# Patient Record
Sex: FEMALE | Race: BLACK OR AFRICAN AMERICAN | Employment: FULL TIME | URBAN - METROPOLITAN AREA
[De-identification: names, ages, dates, MRNs, and addresses within clinical notes are randomized per-mention and may not be internally consistent; named-entity substitution may affect disease eponyms.]

---

## 2017-01-12 ENCOUNTER — HOSPITAL ENCOUNTER (OUTPATIENT)
Dept: MRI IMAGING | Age: 47
Discharge: HOME | End: 2017-01-12
Attending: NURSE PRACTITIONER
Payer: COMMERCIAL

## 2017-01-12 DIAGNOSIS — R29.898 WEAKNESS OF RIGHT FOOT: ICD-10-CM

## 2017-01-12 DIAGNOSIS — R20.2 PARESTHESIA OF RIGHT LEG: ICD-10-CM

## 2017-01-12 DIAGNOSIS — M54.9 SEVERE BACK PAIN: ICD-10-CM

## 2017-01-12 PROCEDURE — 72148 MRI LUMBAR SPINE W/O DYE: CPT

## 2019-02-13 ENCOUNTER — APPOINTMENT (OUTPATIENT)
Dept: MRI IMAGING | Age: 49
End: 2019-02-13
Attending: EMERGENCY MEDICINE
Payer: COMMERCIAL

## 2019-02-13 ENCOUNTER — HOSPITAL ENCOUNTER (EMERGENCY)
Age: 49
Discharge: OTHER INSTITUTION | End: 2019-02-13
Attending: EMERGENCY MEDICINE
Payer: COMMERCIAL

## 2019-02-13 ENCOUNTER — ANESTHESIA (OUTPATIENT)
Dept: SURGERY | Age: 49
End: 2019-02-13
Payer: COMMERCIAL

## 2019-02-13 ENCOUNTER — HOSPITAL ENCOUNTER (OUTPATIENT)
Age: 49
Setting detail: OBSERVATION
Discharge: HOME | End: 2019-02-15
Attending: EMERGENCY MEDICINE | Admitting: SPECIALIST
Payer: COMMERCIAL

## 2019-02-13 ENCOUNTER — ANESTHESIA EVENT (OUTPATIENT)
Dept: SURGERY | Age: 49
End: 2019-02-13
Payer: COMMERCIAL

## 2019-02-13 VITALS
OXYGEN SATURATION: 98 % | SYSTOLIC BLOOD PRESSURE: 159 MMHG | BODY MASS INDEX: 26.07 KG/M2 | DIASTOLIC BLOOD PRESSURE: 94 MMHG | HEART RATE: 48 BPM | WEIGHT: 176 LBS | HEIGHT: 69 IN | RESPIRATION RATE: 14 BRPM | TEMPERATURE: 99.5 F

## 2019-02-13 DIAGNOSIS — G83.4 CAUDA EQUINA SYNDROME (HCC): Primary | ICD-10-CM

## 2019-02-13 DIAGNOSIS — M54.41 ACUTE BILATERAL LOW BACK PAIN WITH RIGHT-SIDED SCIATICA: ICD-10-CM

## 2019-02-13 DIAGNOSIS — M51.27 HERNIATED NUCLEUS PULPOSUS, L5-S1: Primary | ICD-10-CM

## 2019-02-13 LAB
ANION GAP SERPL CALC-SCNC: 10 MMOL/L (ref 5–15)
APPEARANCE UR: CLEAR
BACTERIA URNS QL MICRO: NEGATIVE /HPF
BASOPHILS # BLD: 0 K/UL (ref 0–0.1)
BASOPHILS NFR BLD: 0 % (ref 0–1)
BILIRUB UR QL: NEGATIVE
BUN SERPL-MCNC: 22 MG/DL (ref 6–20)
BUN/CREAT SERPL: 21 (ref 12–20)
CALCIUM SERPL-MCNC: 9.2 MG/DL (ref 8.5–10.1)
CHLORIDE SERPL-SCNC: 102 MMOL/L (ref 97–108)
CO2 SERPL-SCNC: 28 MMOL/L (ref 21–32)
COLOR UR: NORMAL
CREAT SERPL-MCNC: 1.07 MG/DL (ref 0.55–1.02)
CRP SERPL-MCNC: <0.29 MG/DL
DIFFERENTIAL METHOD BLD: ABNORMAL
EOSINOPHIL # BLD: 0 K/UL (ref 0–0.4)
EOSINOPHIL NFR BLD: 0 % (ref 0–7)
EPITH CASTS URNS QL MICRO: NORMAL /LPF
ERYTHROCYTE [DISTWIDTH] IN BLOOD BY AUTOMATED COUNT: 13.2 % (ref 11.5–14.5)
ERYTHROCYTE [SEDIMENTATION RATE] IN BLOOD: 3 MM/HR (ref 0–20)
GLUCOSE SERPL-MCNC: 132 MG/DL (ref 65–100)
GLUCOSE UR STRIP.AUTO-MCNC: NEGATIVE MG/DL
HCT VFR BLD AUTO: 45 % (ref 35–47)
HGB BLD-MCNC: 14.7 G/DL (ref 11.5–16)
HGB UR QL STRIP: NEGATIVE
IMM GRANULOCYTES # BLD AUTO: 0.1 K/UL (ref 0–0.04)
IMM GRANULOCYTES NFR BLD AUTO: 1 % (ref 0–0.5)
KETONES UR QL STRIP.AUTO: NEGATIVE MG/DL
LEUKOCYTE ESTERASE UR QL STRIP.AUTO: NEGATIVE
LYMPHOCYTES # BLD: 0.8 K/UL (ref 0.8–3.5)
LYMPHOCYTES NFR BLD: 6 % (ref 12–49)
MCH RBC QN AUTO: 28.7 PG (ref 26–34)
MCHC RBC AUTO-ENTMCNC: 32.7 G/DL (ref 30–36.5)
MCV RBC AUTO: 87.7 FL (ref 80–99)
MONOCYTES # BLD: 0.7 K/UL (ref 0–1)
MONOCYTES NFR BLD: 5 % (ref 5–13)
NEUTS SEG # BLD: 12.7 K/UL (ref 1.8–8)
NEUTS SEG NFR BLD: 89 % (ref 32–75)
NITRITE UR QL STRIP.AUTO: NEGATIVE
NRBC # BLD: 0 K/UL (ref 0–0.01)
NRBC BLD-RTO: 0 PER 100 WBC
PH UR STRIP: 6 [PH] (ref 5–8)
PLATELET # BLD AUTO: 250 K/UL (ref 150–400)
PMV BLD AUTO: 10.2 FL (ref 8.9–12.9)
POTASSIUM SERPL-SCNC: 4.4 MMOL/L (ref 3.5–5.1)
PROT UR STRIP-MCNC: NEGATIVE MG/DL
RBC # BLD AUTO: 5.13 M/UL (ref 3.8–5.2)
RBC #/AREA URNS HPF: NORMAL /HPF (ref 0–5)
SODIUM SERPL-SCNC: 140 MMOL/L (ref 136–145)
SP GR UR REFRACTOMETRY: 1.03 (ref 1–1.03)
UA: UC IF INDICATED,UAUC: NORMAL
UROBILINOGEN UR QL STRIP.AUTO: 0.2 EU/DL (ref 0.2–1)
WBC # BLD AUTO: 14.3 K/UL (ref 3.6–11)
WBC URNS QL MICRO: NORMAL /HPF (ref 0–4)

## 2019-02-13 PROCEDURE — 99285 EMERGENCY DEPT VISIT HI MDM: CPT

## 2019-02-13 PROCEDURE — 77030034849: Performed by: SPECIALIST

## 2019-02-13 PROCEDURE — 77030018836 HC SOL IRR NACL ICUM -A: Performed by: SPECIALIST

## 2019-02-13 PROCEDURE — 81001 URINALYSIS AUTO W/SCOPE: CPT

## 2019-02-13 PROCEDURE — 72148 MRI LUMBAR SPINE W/O DYE: CPT

## 2019-02-13 PROCEDURE — 76060000035 HC ANESTHESIA 2 TO 2.5 HR: Performed by: SPECIALIST

## 2019-02-13 PROCEDURE — 77030032490 HC SLV COMPR SCD KNE COVD -B: Performed by: SPECIALIST

## 2019-02-13 PROCEDURE — 36415 COLL VENOUS BLD VENIPUNCTURE: CPT

## 2019-02-13 PROCEDURE — 72146 MRI CHEST SPINE W/O DYE: CPT

## 2019-02-13 PROCEDURE — 77030003666 HC NDL SPINAL BD -A: Performed by: SPECIALIST

## 2019-02-13 PROCEDURE — 76010000171 HC OR TIME 2 TO 2.5 HR INTENSV-TIER 1: Performed by: SPECIALIST

## 2019-02-13 PROCEDURE — 85652 RBC SED RATE AUTOMATED: CPT

## 2019-02-13 PROCEDURE — 99283 EMERGENCY DEPT VISIT LOW MDM: CPT

## 2019-02-13 PROCEDURE — 74011250636 HC RX REV CODE- 250/636: Performed by: EMERGENCY MEDICINE

## 2019-02-13 PROCEDURE — 76210000017 HC OR PH I REC 1.5 TO 2 HR: Performed by: SPECIALIST

## 2019-02-13 PROCEDURE — 96374 THER/PROPH/DIAG INJ IV PUSH: CPT

## 2019-02-13 PROCEDURE — 80048 BASIC METABOLIC PNL TOTAL CA: CPT

## 2019-02-13 PROCEDURE — 85025 COMPLETE CBC W/AUTO DIFF WBC: CPT

## 2019-02-13 PROCEDURE — 77030003029 HC SUT VCRL J&J -B: Performed by: SPECIALIST

## 2019-02-13 PROCEDURE — 77030029099 HC BN WAX SSPC -A: Performed by: SPECIALIST

## 2019-02-13 PROCEDURE — 86140 C-REACTIVE PROTEIN: CPT

## 2019-02-13 PROCEDURE — 74011250636 HC RX REV CODE- 250/636

## 2019-02-13 PROCEDURE — 77030002933 HC SUT MCRYL J&J -A: Performed by: SPECIALIST

## 2019-02-13 RX ORDER — DEXAMETHASONE SODIUM PHOSPHATE 10 MG/ML
10 INJECTION INTRAMUSCULAR; INTRAVENOUS
Status: COMPLETED | OUTPATIENT
Start: 2019-02-13 | End: 2019-02-13

## 2019-02-13 RX ORDER — OXYCODONE AND ACETAMINOPHEN 2.5; 325 MG/1; MG/1
1 TABLET ORAL
COMMUNITY
End: 2019-02-15

## 2019-02-13 RX ADMIN — DEXAMETHASONE SODIUM PHOSPHATE 10 MG: 10 INJECTION, SOLUTION INTRAMUSCULAR; INTRAVENOUS at 20:25

## 2019-02-13 NOTE — ED PROVIDER NOTES
51 y.o. female with no significant past medical history who presents from home with chief complaint of back pain. Patient endorses history of a herniated disc in her L5-S1 in 2016 which she received an epidural for by Dr. Marcello Sacks, MD. However, patient states she had onset again of lower back pain a month ago which worsened in nature a week ago. Patient was seen by her PCP two days ago and was sent home with Prednisone which she has been taking daily. Patient presents to [facility] ED today with persisting lower back pain which is constant and has been worsening in nature. Patient reports accompanying numbness in her groin and lower back, difficulties with urination and bowel movements, and episodes of urine incontinence all of which onset two nights ago. Patient reports taking Oxycodone for symptoms with temporary relief, last dose PTA. Patient notes having an appointment with Dr. Kimberly Fink next week. Pt denies fever, chills, cough, congestion, shortness of breath, chest pain, abdominal pain, nausea, vomiting, diarrhea, or dysuria. There are no other acute medical concerns at this time. PCP: Ovi Wooten NP Note written by Елена Shaffer, as dictated by Maria E Finley MD 2:10 PM 
 
 
 
 
  
 
No past medical history on file. No past surgical history on file. No family history on file. Social History Socioeconomic History  Marital status: UNKNOWN Spouse name: Not on file  Number of children: Not on file  Years of education: Not on file  Highest education level: Not on file Social Needs  Financial resource strain: Not on file  Food insecurity - worry: Not on file  Food insecurity - inability: Not on file  Transportation needs - medical: Not on file  Transportation needs - non-medical: Not on file Occupational History  Not on file Tobacco Use  Smoking status: Not on file Substance and Sexual Activity  Alcohol use: Not on file  Drug use: Not on file  Sexual activity: Not on file Other Topics Concern  Not on file Social History Narrative  Not on file ALLERGIES: Patient has no allergy information on record. Review of Systems Constitutional: Negative for chills and fever. HENT: Negative for congestion. Respiratory: Negative for cough and shortness of breath. Cardiovascular: Negative for chest pain. Gastrointestinal: Positive for constipation. Negative for abdominal pain, diarrhea, nausea and vomiting. Genitourinary: Positive for difficulty urinating. Negative for dysuria. Musculoskeletal: Positive for back pain. Neurological: Positive for numbness. Negative for dizziness and light-headedness. All other systems reviewed and are negative. There were no vitals filed for this visit. Physical Exam  
Constitutional: She is oriented to person, place, and time. She appears well-developed and well-nourished. HENT:  
Head: Normocephalic and atraumatic. Eyes: No scleral icterus. Neck: Normal range of motion. Cardiovascular: Normal rate. Pulmonary/Chest: Effort normal.  
Abdominal: She exhibits no distension. Genitourinary: Rectal exam shows anal tone abnormal.  
Musculoskeletal: No spinous process tenderness Neurological: She is alert and oriented to person, place, and time. Skin: No rash noted. No erythema. Nursing note and vitals reviewed. Note written by Елена Shabazz, as dictated by Sejal Win MD 2:21 PM 
  
 
MDM Number of Diagnoses or Management Options Amount and/or Complexity of Data Reviewed Clinical lab tests: ordered and reviewed Tests in the radiology section of CPT®: ordered and reviewed Decide to obtain previous medical records or to obtain history from someone other than the patient: yes Obtain history from someone other than the patient: yes (family) Review and summarize past medical records: yes Discuss the patient with other providers: yes (Ortho, neurosurgery) Independent visualization of images, tracings, or specimens: yes Critical Care Total time providing critical care: 30-74 minutes Patient Progress Patient progress: stable Procedures Rectal-no rectal tone. Post void bladder ultrasound with small amount of urine in bladder. 8:20 PM 
Discussed with Dr. Sergio Phillips ortho on call for Dr. Colleen Sepulveda. He does not have spine privileges at this hospital. Recommends transfer of pt. Discussed with pt, she would like to go to [facility]. Called [facility], neurosurgery is covering spine call tonight. Dr. Carmen Bonds paged. CONSULT NOTE: 
8:48 PM Fletcher Clay MD spoke with Dr. Carmen Bonds, Consult for Neurosurgery. Discussed available diagnostic tests and clinical findings. Dr. Carmen Bonds will accept pt for admission at [facility]. 
 
 
9:01 PM 
Fletcher Clay MD spoke with Dr. Osiris Mar at [facility] ED  Discussed available diagnostic tests and clinical findings. Accepts pt for transfer to ED at [facility].

## 2019-02-13 NOTE — ED TRIAGE NOTES
Pt arrives for lower back pain with difficulty urinating/constipation & incontinence. Hx of herniated disc in same area. Pain radiates down right leg. Reports midsection/groin feels \"numb\" starting Monday evening. Pt given diclofenac & prednisone by PCP with minimal relief. Pt took 2 oxycodone PTA

## 2019-02-14 ENCOUNTER — APPOINTMENT (OUTPATIENT)
Dept: GENERAL RADIOLOGY | Age: 49
End: 2019-02-14
Attending: SPECIALIST
Payer: COMMERCIAL

## 2019-02-14 PROBLEM — M51.26 LUMBAR HERNIATED DISC: Status: ACTIVE | Noted: 2019-02-14

## 2019-02-14 PROCEDURE — 97165 OT EVAL LOW COMPLEX 30 MIN: CPT

## 2019-02-14 PROCEDURE — 51798 US URINE CAPACITY MEASURE: CPT

## 2019-02-14 PROCEDURE — 74011250636 HC RX REV CODE- 250/636

## 2019-02-14 PROCEDURE — 77030008684 HC TU ET CUF COVD -B: Performed by: ANESTHESIOLOGY

## 2019-02-14 PROCEDURE — 76000 FLUOROSCOPY <1 HR PHYS/QHP: CPT

## 2019-02-14 PROCEDURE — 97116 GAIT TRAINING THERAPY: CPT

## 2019-02-14 PROCEDURE — 97161 PT EVAL LOW COMPLEX 20 MIN: CPT

## 2019-02-14 PROCEDURE — 74011000250 HC RX REV CODE- 250: Performed by: SPECIALIST

## 2019-02-14 PROCEDURE — 99218 HC RM OBSERVATION: CPT

## 2019-02-14 PROCEDURE — 74011000250 HC RX REV CODE- 250

## 2019-02-14 PROCEDURE — 74011250637 HC RX REV CODE- 250/637: Performed by: SPECIALIST

## 2019-02-14 PROCEDURE — 77030013079 HC BLNKT BAIR HGGR 3M -A: Performed by: ANESTHESIOLOGY

## 2019-02-14 PROCEDURE — 74011250636 HC RX REV CODE- 250/636: Performed by: ANESTHESIOLOGY

## 2019-02-14 PROCEDURE — 74011250636 HC RX REV CODE- 250/636: Performed by: SPECIALIST

## 2019-02-14 PROCEDURE — 77030026438 HC STYL ET INTUB CARD -A: Performed by: ANESTHESIOLOGY

## 2019-02-14 PROCEDURE — 97535 SELF CARE MNGMENT TRAINING: CPT

## 2019-02-14 RX ORDER — DIAZEPAM 5 MG/1
5 TABLET ORAL
Status: DISCONTINUED | OUTPATIENT
Start: 2019-02-14 | End: 2019-02-15 | Stop reason: HOSPADM

## 2019-02-14 RX ORDER — ROCURONIUM BROMIDE 10 MG/ML
INJECTION, SOLUTION INTRAVENOUS AS NEEDED
Status: DISCONTINUED | OUTPATIENT
Start: 2019-02-14 | End: 2019-02-14 | Stop reason: HOSPADM

## 2019-02-14 RX ORDER — SODIUM CHLORIDE 0.9 % (FLUSH) 0.9 %
5-40 SYRINGE (ML) INJECTION EVERY 8 HOURS
Status: DISCONTINUED | OUTPATIENT
Start: 2019-02-14 | End: 2019-02-14 | Stop reason: HOSPADM

## 2019-02-14 RX ORDER — HYDROMORPHONE HYDROCHLORIDE 1 MG/ML
INJECTION, SOLUTION INTRAMUSCULAR; INTRAVENOUS; SUBCUTANEOUS AS NEEDED
Status: DISCONTINUED | OUTPATIENT
Start: 2019-02-14 | End: 2019-02-14 | Stop reason: HOSPADM

## 2019-02-14 RX ORDER — MORPHINE SULFATE 10 MG/ML
2 INJECTION, SOLUTION INTRAMUSCULAR; INTRAVENOUS
Status: DISCONTINUED | OUTPATIENT
Start: 2019-02-14 | End: 2019-02-14 | Stop reason: HOSPADM

## 2019-02-14 RX ORDER — KETOROLAC TROMETHAMINE 30 MG/ML
INJECTION, SOLUTION INTRAMUSCULAR; INTRAVENOUS AS NEEDED
Status: DISCONTINUED | OUTPATIENT
Start: 2019-02-14 | End: 2019-02-14 | Stop reason: HOSPADM

## 2019-02-14 RX ORDER — ONDANSETRON 2 MG/ML
4 INJECTION INTRAMUSCULAR; INTRAVENOUS AS NEEDED
Status: DISCONTINUED | OUTPATIENT
Start: 2019-02-14 | End: 2019-02-14 | Stop reason: HOSPADM

## 2019-02-14 RX ORDER — SODIUM CHLORIDE 9 MG/ML
125 INJECTION, SOLUTION INTRAVENOUS CONTINUOUS
Status: DISCONTINUED | OUTPATIENT
Start: 2019-02-14 | End: 2019-02-14

## 2019-02-14 RX ORDER — FENTANYL CITRATE 50 UG/ML
INJECTION, SOLUTION INTRAMUSCULAR; INTRAVENOUS AS NEEDED
Status: DISCONTINUED | OUTPATIENT
Start: 2019-02-14 | End: 2019-02-14 | Stop reason: HOSPADM

## 2019-02-14 RX ORDER — FENTANYL CITRATE 50 UG/ML
50 INJECTION, SOLUTION INTRAMUSCULAR; INTRAVENOUS AS NEEDED
Status: DISCONTINUED | OUTPATIENT
Start: 2019-02-14 | End: 2019-02-14 | Stop reason: HOSPADM

## 2019-02-14 RX ORDER — SODIUM CHLORIDE 0.9 % (FLUSH) 0.9 %
5-40 SYRINGE (ML) INJECTION EVERY 8 HOURS
Status: DISCONTINUED | OUTPATIENT
Start: 2019-02-14 | End: 2019-02-15 | Stop reason: HOSPADM

## 2019-02-14 RX ORDER — SODIUM CHLORIDE, SODIUM LACTATE, POTASSIUM CHLORIDE, CALCIUM CHLORIDE 600; 310; 30; 20 MG/100ML; MG/100ML; MG/100ML; MG/100ML
125 INJECTION, SOLUTION INTRAVENOUS CONTINUOUS
Status: DISCONTINUED | OUTPATIENT
Start: 2019-02-14 | End: 2019-02-14 | Stop reason: HOSPADM

## 2019-02-14 RX ORDER — MIDAZOLAM HYDROCHLORIDE 1 MG/ML
0.5 INJECTION, SOLUTION INTRAMUSCULAR; INTRAVENOUS
Status: DISCONTINUED | OUTPATIENT
Start: 2019-02-14 | End: 2019-02-14 | Stop reason: HOSPADM

## 2019-02-14 RX ORDER — FACIAL-BODY WIPES
10 EACH TOPICAL DAILY PRN
Status: DISCONTINUED | OUTPATIENT
Start: 2019-02-14 | End: 2019-02-15 | Stop reason: HOSPADM

## 2019-02-14 RX ORDER — LIDOCAINE HYDROCHLORIDE 10 MG/ML
0.1 INJECTION, SOLUTION EPIDURAL; INFILTRATION; INTRACAUDAL; PERINEURAL AS NEEDED
Status: DISCONTINUED | OUTPATIENT
Start: 2019-02-14 | End: 2019-02-14 | Stop reason: HOSPADM

## 2019-02-14 RX ORDER — OXYCODONE AND ACETAMINOPHEN 5; 325 MG/1; MG/1
1 TABLET ORAL AS NEEDED
Status: DISCONTINUED | OUTPATIENT
Start: 2019-02-14 | End: 2019-02-14 | Stop reason: HOSPADM

## 2019-02-14 RX ORDER — PROPOFOL 10 MG/ML
INJECTION, EMULSION INTRAVENOUS AS NEEDED
Status: DISCONTINUED | OUTPATIENT
Start: 2019-02-14 | End: 2019-02-14 | Stop reason: HOSPADM

## 2019-02-14 RX ORDER — ONDANSETRON 2 MG/ML
INJECTION INTRAMUSCULAR; INTRAVENOUS AS NEEDED
Status: DISCONTINUED | OUTPATIENT
Start: 2019-02-14 | End: 2019-02-14 | Stop reason: HOSPADM

## 2019-02-14 RX ORDER — BUPIVACAINE HYDROCHLORIDE AND EPINEPHRINE 5; 5 MG/ML; UG/ML
INJECTION, SOLUTION EPIDURAL; INTRACAUDAL; PERINEURAL AS NEEDED
Status: DISCONTINUED | OUTPATIENT
Start: 2019-02-14 | End: 2019-02-14 | Stop reason: HOSPADM

## 2019-02-14 RX ORDER — NALOXONE HYDROCHLORIDE 0.4 MG/ML
0.4 INJECTION, SOLUTION INTRAMUSCULAR; INTRAVENOUS; SUBCUTANEOUS AS NEEDED
Status: DISCONTINUED | OUTPATIENT
Start: 2019-02-14 | End: 2019-02-15 | Stop reason: HOSPADM

## 2019-02-14 RX ORDER — CEFAZOLIN SODIUM/WATER 2 G/20 ML
2 SYRINGE (ML) INTRAVENOUS EVERY 8 HOURS
Status: COMPLETED | OUTPATIENT
Start: 2019-02-14 | End: 2019-02-14

## 2019-02-14 RX ORDER — MIDAZOLAM HYDROCHLORIDE 1 MG/ML
1 INJECTION, SOLUTION INTRAMUSCULAR; INTRAVENOUS AS NEEDED
Status: DISCONTINUED | OUTPATIENT
Start: 2019-02-14 | End: 2019-02-14 | Stop reason: HOSPADM

## 2019-02-14 RX ORDER — CEFAZOLIN SODIUM 1 G/3ML
INJECTION, POWDER, FOR SOLUTION INTRAMUSCULAR; INTRAVENOUS AS NEEDED
Status: DISCONTINUED | OUTPATIENT
Start: 2019-02-14 | End: 2019-02-14 | Stop reason: HOSPADM

## 2019-02-14 RX ORDER — SODIUM CHLORIDE 9 MG/ML
25 INJECTION, SOLUTION INTRAVENOUS CONTINUOUS
Status: DISCONTINUED | OUTPATIENT
Start: 2019-02-14 | End: 2019-02-14 | Stop reason: HOSPADM

## 2019-02-14 RX ORDER — ONDANSETRON 2 MG/ML
4 INJECTION INTRAMUSCULAR; INTRAVENOUS
Status: ACTIVE | OUTPATIENT
Start: 2019-02-14 | End: 2019-02-15

## 2019-02-14 RX ORDER — HYDROCODONE BITARTRATE AND ACETAMINOPHEN 5; 325 MG/1; MG/1
1 TABLET ORAL
Qty: 15 TAB | Refills: 0 | Status: SHIPPED | OUTPATIENT
Start: 2019-02-14

## 2019-02-14 RX ORDER — GLYCOPYRROLATE 0.2 MG/ML
INJECTION INTRAMUSCULAR; INTRAVENOUS AS NEEDED
Status: DISCONTINUED | OUTPATIENT
Start: 2019-02-14 | End: 2019-02-14 | Stop reason: HOSPADM

## 2019-02-14 RX ORDER — CYCLOBENZAPRINE HCL 10 MG
10 TABLET ORAL
Qty: 20 TAB | Refills: 0 | Status: SHIPPED | OUTPATIENT
Start: 2019-02-14

## 2019-02-14 RX ORDER — HYDROCODONE BITARTRATE AND ACETAMINOPHEN 5; 325 MG/1; MG/1
2 TABLET ORAL
Status: DISCONTINUED | OUTPATIENT
Start: 2019-02-14 | End: 2019-02-15

## 2019-02-14 RX ORDER — ACETAMINOPHEN 10 MG/ML
INJECTION, SOLUTION INTRAVENOUS AS NEEDED
Status: DISCONTINUED | OUTPATIENT
Start: 2019-02-14 | End: 2019-02-14 | Stop reason: HOSPADM

## 2019-02-14 RX ORDER — ADHESIVE BANDAGE
30 BANDAGE TOPICAL DAILY PRN
Status: DISCONTINUED | OUTPATIENT
Start: 2019-02-14 | End: 2019-02-15 | Stop reason: HOSPADM

## 2019-02-14 RX ORDER — MIDAZOLAM HYDROCHLORIDE 1 MG/ML
INJECTION, SOLUTION INTRAMUSCULAR; INTRAVENOUS AS NEEDED
Status: DISCONTINUED | OUTPATIENT
Start: 2019-02-14 | End: 2019-02-14 | Stop reason: HOSPADM

## 2019-02-14 RX ORDER — DIPHENHYDRAMINE HYDROCHLORIDE 50 MG/ML
12.5 INJECTION, SOLUTION INTRAMUSCULAR; INTRAVENOUS AS NEEDED
Status: DISCONTINUED | OUTPATIENT
Start: 2019-02-14 | End: 2019-02-14 | Stop reason: HOSPADM

## 2019-02-14 RX ORDER — NEOSTIGMINE METHYLSULFATE 1 MG/ML
INJECTION INTRAVENOUS AS NEEDED
Status: DISCONTINUED | OUTPATIENT
Start: 2019-02-14 | End: 2019-02-14 | Stop reason: HOSPADM

## 2019-02-14 RX ORDER — SODIUM CHLORIDE, SODIUM LACTATE, POTASSIUM CHLORIDE, CALCIUM CHLORIDE 600; 310; 30; 20 MG/100ML; MG/100ML; MG/100ML; MG/100ML
125 INJECTION, SOLUTION INTRAVENOUS CONTINUOUS
Status: DISCONTINUED | OUTPATIENT
Start: 2019-02-14 | End: 2019-02-14

## 2019-02-14 RX ORDER — SODIUM CHLORIDE 0.9 % (FLUSH) 0.9 %
5-40 SYRINGE (ML) INJECTION AS NEEDED
Status: DISCONTINUED | OUTPATIENT
Start: 2019-02-14 | End: 2019-02-14 | Stop reason: HOSPADM

## 2019-02-14 RX ORDER — FENTANYL CITRATE 50 UG/ML
25 INJECTION, SOLUTION INTRAMUSCULAR; INTRAVENOUS
Status: COMPLETED | OUTPATIENT
Start: 2019-02-14 | End: 2019-02-14

## 2019-02-14 RX ORDER — CYCLOBENZAPRINE HCL 10 MG
10 TABLET ORAL
Status: DISCONTINUED | OUTPATIENT
Start: 2019-02-14 | End: 2019-02-15 | Stop reason: HOSPADM

## 2019-02-14 RX ORDER — SODIUM CHLORIDE, SODIUM LACTATE, POTASSIUM CHLORIDE, CALCIUM CHLORIDE 600; 310; 30; 20 MG/100ML; MG/100ML; MG/100ML; MG/100ML
INJECTION, SOLUTION INTRAVENOUS
Status: DISCONTINUED | OUTPATIENT
Start: 2019-02-14 | End: 2019-02-14 | Stop reason: HOSPADM

## 2019-02-14 RX ORDER — SODIUM CHLORIDE 0.9 % (FLUSH) 0.9 %
5-40 SYRINGE (ML) INJECTION AS NEEDED
Status: DISCONTINUED | OUTPATIENT
Start: 2019-02-14 | End: 2019-02-15 | Stop reason: HOSPADM

## 2019-02-14 RX ORDER — LIDOCAINE HYDROCHLORIDE 20 MG/ML
INJECTION, SOLUTION EPIDURAL; INFILTRATION; INTRACAUDAL; PERINEURAL AS NEEDED
Status: DISCONTINUED | OUTPATIENT
Start: 2019-02-14 | End: 2019-02-14 | Stop reason: HOSPADM

## 2019-02-14 RX ORDER — HYDROCODONE BITARTRATE AND ACETAMINOPHEN 5; 325 MG/1; MG/1
1 TABLET ORAL
Status: DISCONTINUED | OUTPATIENT
Start: 2019-02-14 | End: 2019-02-15 | Stop reason: HOSPADM

## 2019-02-14 RX ORDER — ACETAMINOPHEN 325 MG/1
650 TABLET ORAL EVERY 6 HOURS
Status: DISCONTINUED | OUTPATIENT
Start: 2019-02-14 | End: 2019-02-15 | Stop reason: HOSPADM

## 2019-02-14 RX ORDER — DIPHENHYDRAMINE HCL 25 MG
25 CAPSULE ORAL
Status: DISCONTINUED | OUTPATIENT
Start: 2019-02-14 | End: 2019-02-15 | Stop reason: HOSPADM

## 2019-02-14 RX ADMIN — ROCURONIUM BROMIDE 20 MG: 10 INJECTION, SOLUTION INTRAVENOUS at 00:43

## 2019-02-14 RX ADMIN — HYDROCODONE BITARTRATE AND ACETAMINOPHEN 2 TABLET: 5; 325 TABLET ORAL at 23:47

## 2019-02-14 RX ADMIN — DIAZEPAM 5 MG: 5 TABLET ORAL at 07:38

## 2019-02-14 RX ADMIN — HYDROMORPHONE HYDROCHLORIDE 0.5 MG: 1 INJECTION, SOLUTION INTRAMUSCULAR; INTRAVENOUS; SUBCUTANEOUS at 02:30

## 2019-02-14 RX ADMIN — FENTANYL CITRATE 25 MCG: 50 INJECTION INTRAMUSCULAR; INTRAVENOUS at 03:05

## 2019-02-14 RX ADMIN — HYDROMORPHONE HYDROCHLORIDE 0.5 MG: 1 INJECTION, SOLUTION INTRAMUSCULAR; INTRAVENOUS; SUBCUTANEOUS at 01:06

## 2019-02-14 RX ADMIN — CEFAZOLIN SODIUM 2 G: 1 INJECTION, POWDER, FOR SOLUTION INTRAMUSCULAR; INTRAVENOUS at 00:16

## 2019-02-14 RX ADMIN — ACETAMINOPHEN 650 MG: 325 TABLET ORAL at 12:30

## 2019-02-14 RX ADMIN — GLYCOPYRROLATE 0.6 MG: 0.2 INJECTION INTRAMUSCULAR; INTRAVENOUS at 02:08

## 2019-02-14 RX ADMIN — Medication 2 G: at 17:58

## 2019-02-14 RX ADMIN — SODIUM CHLORIDE, SODIUM LACTATE, POTASSIUM CHLORIDE, CALCIUM CHLORIDE: 600; 310; 30; 20 INJECTION, SOLUTION INTRAVENOUS at 00:00

## 2019-02-14 RX ADMIN — PROPOFOL 110 MG: 10 INJECTION, EMULSION INTRAVENOUS at 00:07

## 2019-02-14 RX ADMIN — MIDAZOLAM HYDROCHLORIDE 2 MG: 1 INJECTION, SOLUTION INTRAMUSCULAR; INTRAVENOUS at 00:00

## 2019-02-14 RX ADMIN — SODIUM CHLORIDE 125 ML/HR: 900 INJECTION, SOLUTION INTRAVENOUS at 06:38

## 2019-02-14 RX ADMIN — NEOSTIGMINE METHYLSULFATE 3 MG: 1 INJECTION INTRAVENOUS at 02:08

## 2019-02-14 RX ADMIN — LIDOCAINE HYDROCHLORIDE 80 MG: 20 INJECTION, SOLUTION EPIDURAL; INFILTRATION; INTRACAUDAL; PERINEURAL at 00:07

## 2019-02-14 RX ADMIN — ONDANSETRON 4 MG: 2 INJECTION INTRAMUSCULAR; INTRAVENOUS at 01:41

## 2019-02-14 RX ADMIN — KETOROLAC TROMETHAMINE 30 MG: 30 INJECTION, SOLUTION INTRAMUSCULAR; INTRAVENOUS at 01:47

## 2019-02-14 RX ADMIN — FENTANYL CITRATE 25 MCG: 50 INJECTION INTRAMUSCULAR; INTRAVENOUS at 03:10

## 2019-02-14 RX ADMIN — Medication 10 ML: at 23:45

## 2019-02-14 RX ADMIN — ROCURONIUM BROMIDE 30 MG: 10 INJECTION, SOLUTION INTRAVENOUS at 00:07

## 2019-02-14 RX ADMIN — ACETAMINOPHEN 1000 MG: 10 INJECTION, SOLUTION INTRAVENOUS at 00:21

## 2019-02-14 RX ADMIN — ACETAMINOPHEN 650 MG: 325 TABLET ORAL at 06:38

## 2019-02-14 RX ADMIN — SODIUM CHLORIDE 125 ML/HR: 900 INJECTION, SOLUTION INTRAVENOUS at 11:51

## 2019-02-14 RX ADMIN — HYDROCODONE BITARTRATE AND ACETAMINOPHEN 1 TABLET: 5; 325 TABLET ORAL at 12:30

## 2019-02-14 RX ADMIN — FENTANYL CITRATE 25 MCG: 50 INJECTION INTRAMUSCULAR; INTRAVENOUS at 03:00

## 2019-02-14 RX ADMIN — ACETAMINOPHEN 650 MG: 325 TABLET ORAL at 17:58

## 2019-02-14 RX ADMIN — FENTANYL CITRATE 100 MCG: 50 INJECTION, SOLUTION INTRAMUSCULAR; INTRAVENOUS at 00:07

## 2019-02-14 RX ADMIN — FENTANYL CITRATE 25 MCG: 50 INJECTION INTRAMUSCULAR; INTRAVENOUS at 03:15

## 2019-02-14 RX ADMIN — Medication 2 G: at 07:41

## 2019-02-14 RX ADMIN — ROCURONIUM BROMIDE 10 MG: 10 INJECTION, SOLUTION INTRAVENOUS at 01:26

## 2019-02-14 RX ADMIN — MAGNESIUM HYDROXIDE 30 ML: 400 SUSPENSION ORAL at 17:58

## 2019-02-14 NOTE — ED TRIAGE NOTES
TRIAGE: Pt arrives from [facility] for dx of cauda equina syndrome. Pt arrives there with report of lower back pain, difficulty urinating or having a BM.  Pt also reported R sided numbness to leg that has since resolved, pt is ambulatory in ED

## 2019-02-14 NOTE — PERIOP NOTES
TRANSFER - OUT REPORT: 
 
Verbal report given to [facility] RN(name) on Kevin Delay  being transferred to 535(unit) for routine post - op Report consisted of patients Situation, Background, Assessment and  
Recommendations(SBAR). Time Pre op antibiotic [id number] Anesthesia Stop time: 0230 Bashir Present on Transfer to floor:yes Order for Bashir on Chart:yes Discharge Prescriptions with Chart:no Information from the following report(s) SBAR, OR Summary, Procedure Summary, Intake/Output, MAR, Recent Results, Med Rec Status and Cardiac Rhythm NSR was reviewed with the receiving nurse. Opportunity for questions and clarification was provided. Is the patient on 02? NO 
     L/Min ra Other Is the patient on a monitor? NO Is the nurse transporting with the patient? NO Surgical Waiting Area notified of patient's transfer from PACU? YES The following personal items collected during your admission accompanied patient upon transfer:  
Dental Appliance: Dental Appliances: None Vision: Visual Aid: Glasses Hearing Aid:   
Jewelry:   
Clothing:   
Other Valuables:   
Valuables sent to safe:

## 2019-02-14 NOTE — ED PROVIDER NOTES
51 y.o. female with past medical history significant for herniated lumbar disc and glaucoma who presents from EMS with chief complaint of back pain. Pt reports lower back pain for 1 month that worsened in severity 1 week ago. She notes accompanying right leg numbness for 3 days. Pt also c/o difficulty urinating, urine incontinence, and constipation. Pt was diagnosed with cauda equina at [facility] earlier today. Pt had an MRI at [facility], which revealed a L5-S1 herniated disk. Dr. Shima Maynard (Neurosurgery) recommended Pt to be transferred from [facility] ED to [facility] ED. Pt states she was seen by her PCP a few days ago. Pt states her PCP prescribed prednisone. Pt states she received a steroid injection at her PCP's office. Pt denies any other pain. There are no other acute medical concerns at this time. PCP: Haritha Lima NP Note written by Елена Sarah, as dictated by Lili Estrada MD 11:00 PM 
 
 
 
The history is provided by the patient. No  was used. No past medical history on file. No past surgical history on file. No family history on file. Social History Socioeconomic History  Marital status: SINGLE Spouse name: Not on file  Number of children: Not on file  Years of education: Not on file  Highest education level: Not on file Social Needs  Financial resource strain: Not on file  Food insecurity - worry: Not on file  Food insecurity - inability: Not on file  Transportation needs - medical: Not on file  Transportation needs - non-medical: Not on file Occupational History  Not on file Tobacco Use  Smoking status: Not on file Substance and Sexual Activity  Alcohol use: Not on file  Drug use: Not on file  Sexual activity: Not on file Other Topics Concern  Not on file Social History Narrative  Not on file ALLERGIES: Patient has no known allergies. Review of Systems Constitutional: Negative for fever. HENT: Negative for facial swelling and nosebleeds. Eyes: Negative for pain. Respiratory: Negative for cough, chest tightness and shortness of breath. Cardiovascular: Negative for chest pain and leg swelling. Gastrointestinal: Positive for constipation. Negative for abdominal pain, diarrhea and vomiting. Endocrine: Negative for polyuria. Genitourinary: Positive for decreased urine volume, difficulty urinating and urgency. Negative for flank pain. Musculoskeletal: Positive for back pain. Negative for arthralgias. Skin: Negative for color change. Allergic/Immunologic: Negative for immunocompromised state. Neurological: Positive for numbness (Right leg). Negative for dizziness and headaches. Hematological: Does not bruise/bleed easily. Psychiatric/Behavioral: Negative for agitation. All other systems reviewed and are negative. There were no vitals filed for this visit. Physical Exam  
Constitutional: She is oriented to person, place, and time. She appears well-developed and well-nourished. No distress. HENT:  
Head: Normocephalic and atraumatic. Right Ear: External ear normal.  
Left Ear: External ear normal.  
Eyes: Conjunctivae and EOM are normal.  
Neck: Normal range of motion. Neck supple. No tracheal deviation present. No thyromegaly present. Cardiovascular: Normal rate, regular rhythm, normal heart sounds and intact distal pulses. Exam reveals no gallop and no friction rub. No murmur heard. Pulmonary/Chest: Effort normal and breath sounds normal. No respiratory distress. She has no wheezes. She has no rales. She exhibits no tenderness. Abdominal: Soft. Bowel sounds are normal. She exhibits no distension. There is no tenderness. Musculoskeletal: Normal range of motion. She exhibits no edema, tenderness or deformity. Herniated L5-S1 disc. Neurological: She is alert and oriented to person, place, and time.  She has normal strength. She displays normal reflexes. No cranial nerve deficit or sensory deficit. She exhibits normal muscle tone. Coordination normal.  
Normal strength 5/5 of both legs. Normal sensation of legs. No saddle anesthesia. Skin: Skin is warm and dry. No rash noted. She is not diaphoretic. No erythema. Psychiatric: She has a normal mood and affect. Her behavior is normal. Judgment and thought content normal.  
Nursing note and vitals reviewed. Note written by Елена Nj, as dictated by Arabella Garcia MD 11:00 PM 
 
 
MDM Number of Diagnoses or Management Options Acute bilateral low back pain with right-sided sciatica:  
Herniated nucleus pulposus, L5-S1:  
Diagnosis management comments: 62-year-old [place] American female presents to the emergency department with concern for cauda equina syndrome. She has had difficulty with urinating and bowel movements. It sounds like she has had some back pain and numbness in her legs mostly in the right leg. MRI showed concern for disc protrusion and with patient's symptoms it was decided to transfer her here to see neurosurgery. We'll page him. Amount and/or Complexity of Data Reviewed Tests in the radiology section of CPT®: ordered and reviewed Decide to obtain previous medical records or to obtain history from someone other than the patient: yes Review and summarize past medical records: yes Independent visualization of images, tracings, or specimens: yes Procedures CONSULT NOTE: 
11:39 PM Arabella Garcia MD spoke with Dr. Magaly Boss, Consult for Neurosurgery. Discussed available diagnostic tests and clinical findings. Dr. Magaly Boss will admit Pt to OR for surgery.

## 2019-02-14 NOTE — PROGRESS NOTES
Problem: Self Care Deficits Care Plan (Adult) Goal: *Acute Goals and Plan of Care (Insert Text) Occupational Therapy Goals Initiated 2/14/2019 1. Patient will perform lower body dressing with modified independence using AE PRN within 4 days. 2.  Patient will perform toileting with modified independence using most appropriate DME within 4 days. 3.  Patient will grooming at modified independence within 4 days. 4.   Patient will verbalize/demonstrate 3/3 back precautions during ADL tasks without cues within 4 days. Occupational Therapy EVALUATION Patient: Sancho Clayton (19 y.o. female) Date: 2/14/2019 Primary Diagnosis: Lumbar herniated disc [M51.26] Procedure(s) (LRB): 
BILATERAL L5-S1 LUMBAR DISCECTOMY (Bilateral) 1 Day Post-Op Precautions:   Back ASSESSMENT : 
Based on the objective data described below, the patient presents with Setup upper body ADLs, Minimum assistance lower body ADLs, and Stand-by assistance to Contact guard assistance assist functional mobility without AD s/p B L5-S1 discectomy POD 0 due to emergent cauda equina symptoms. Pt with onset of R LE numbness, urinary incontinence and saddle area numbness, prompting visit to ER. She tolerated session well with all VSS. Likely one needs 1 more OT session to re-iterate techniques for dressing, toileting and bathing while adhering to back precautions. The following are barriers to independence with ADLs while in acute care:  
- Cognitive and/or behavioral: none - Medical condition: sensation to essie-area, new urinary incontinence   
- Other:    
 
Patient will benefit from skilled acute intervention to address the above impairments. Patients rehabilitation potential is considered to be Good Discharge recommendations: none at home, would benefit from OP women's health for pelvic strengthening Barriers to discharging home, in addition to above listed impairments: none, will discharge to her mother's house. Equipment recommendations for successful discharge (if) home: none PLAN : 
Recommendations and Planned Interventions: self care training, functional mobility training, patient education, home safety training and family training/education Frequency/Duration: Patient will be followed by occupational therapy 5 times a week to address goals. SUBJECTIVE:  
Patient stated I am feeling so much better.  OBJECTIVE DATA SUMMARY:  
HISTORY:  
Past Medical History:  
Diagnosis Date  Glaucoma  Lumbar herniated disc 2016 History reviewed. No pertinent surgical history. Prior Level of Function/Environment/Context: independent. Lives with family at baseline Occupations in which the patient is/was successful, what are the barriers preventing that success:  
Performance Patterns (routines, roles, habits, and rituals):  
Personal Interests and/or values:  
Expanded or extensive additional review of patient history:  
 
Home Situation Home Environment: Private residence # Steps to Enter: 1 One/Two Story Residence: Two story, live on 1st floor Living Alone: No 
Support Systems: Family member(s) Patient Expects to be Discharged to[de-identified] Private residence Current DME Used/Available at Home: None Hand dominance: RightEXAMINATION OF PERFORMANCE DEFICITS: 
Cognitive/Behavioral Status: 
Neurologic State: Alert Orientation Level: Oriented X4 Cognition: Appropriate decision making; Appropriate for age attention/concentration Safety/Judgement: Awareness of environment; Insight into deficits Skin: lumbar incision intact, breakthrough bleeding monitored Edema: none Hearing: Auditory Auditory Impairment: None Vision/Perceptual:   
Tracking: Able to track stimulus in all quadrants w/o difficulty Range of Motion: 
 
AROM: Within functional limits Strength: 
 
Strength: Within functional limits Coordination: Coordination: Within functional limits Fine Motor Skills-Upper: Left Intact; Right Intact Gross Motor Skills-Upper: Left Intact; Right Intact Tone & Sensation: 
 
Tone: Normal 
Sensation: Impaired(c/o saddle paresthesia and diminished R LE) Balance: 
Sitting: Intact Standing: Intact Functional Mobility and Transfers for ADLs:Bed Mobility: 
Supine to Sit: Contact guard assistance Transfers: 
Sit to Stand: Contact guard assistance Stand to Sit: Contact guard assistance Bathroom Mobility: Contact guard assistance Toilet Transfer : Contact guard assistance(elevated toilet) ADL Assessment: 
Feeding: Independent Oral Facial Hygiene/Grooming: Stand-by assistance Bathing: Minimum assistance Upper Body Dressing: Supervision;Setup Lower Body Dressing: Contact guard assistance(demonstrated tailor sitting) Toileting: Contact guard assistance ADL Intervention and task modifications: 
  Pt educated on 3/3 back precautions. Pt educated on the following: no bending, no lifting greater than 5 lbs, no twisting, log-roll technique, repositioning every 20-30 min except when sleeping, brace when OOB . Bathing: Patient instructed when bathing to not submerge wound in water, stand to shower or sponge bathe. Home safety: Patient instructed on home modifications and safety (raise height of ADL objects, appropriate height of chair surfaces, recliner safety, change of floor surfaces, clear pathways) to increase independence and fall prevention with good understanding. Standing: Patient instructed to walk up to sink/counter top/surfaces, step into walker, square off while using objects, slide objects along surfaces, to increase adherence to back precautions and fall prevention with fair understanding. Patient instructed to increase amount of time standing in order to increase independence and tolerance with ADLs. Toileting: Pt instructed to avoid twisting during bowel hygiene. Educated pt on techniques such as flexing at the hips, wiping front to back, using wet wipes, or adaptive equipment such as toilet tongs or [employer]. Grooming Brushing Teeth: Stand-by assistance(good adherence to back precautions) Cognitive Retraining Safety/Judgement: Awareness of environment; Insight into deficits Functional Measure: 
Barthel Index: The Barthel ADL Index: Guidelines 1. The index should be used as a record of what a patient does, not as a record of what a patient could do. 2. The main aim is to establish degree of independence from any help, physical or verbal, however minor and for whatever reason. 3. The need for supervision renders the patient not independent. 4. A patient's performance should be established using the best available evidence. Asking the patient, friends/relatives and nurses are the usual sources, but direct observation and common sense are also important. However direct testing is not needed. 5. Usually the patient's performance over the preceding 24-48 hours is important, but occasionally longer periods will be relevant. 6. Middle categories imply that the patient supplies over 50 per cent of the effort. 7. Use of aids to be independent is allowed. Liz Rivera., Barthel, D.W. ([id number]). Functional evaluation: the Barthel Index. [facility] (14)2. [place] [employer] juan carlos [place], J.J.M.F, Debora Weston., Lamont Strauss., [place], [address] [place] [address] (1999). Measuring the change indisability after inpatient rehabilitation; comparison of the responsiveness of the Barthel Index and Functional [place] Measure. Journal of Neurology, Neurosurgery, and Psychiatry, 66(4), 791-891.  
Emelina Ochoa, N.J.A, RINA Bryant, & Regina Esposito, M.A. (2004.) Assessment of post-stroke quality of life in cost-effectiveness studies: The usefulness of the Barthel Index and the EuroQoL-5D. [facility], 13, [phone] Occupational Therapy Evaluation Charge Determination History Examination Decision-Making LOW Complexity : Brief history review  MEDIUM Complexity : 3-5 performance deficits relating to physical, cognitive , or psychosocial skils that result in activity limitations and / or participation restrictions LOW Complexity : No comorbidities that affect functional and no verbal or physical assistance needed to complete eval tasks Based on the above components, the patient evaluation is determined to be of the following complexity level: LOW Pain: 
5/10 pre session Activity Tolerance:  
normal 
Please refer to the flowsheet for vital signs taken during this treatment. After treatment patient left:  
Caregiver at bedside Call light within reach Family at bedside COMMUNICATION/EDUCATION:  
The patients plan of care was discussed with: Physical Therapist, Registered Nurse and Physician. Home safety education was provided and the patient/caregiver indicated understanding. and Patient/family have participated as able in goal setting and plan of care. This patients plan of care is appropriate for delegation to \[facility] [place] [facility]\"". Thank you for this referral. 
Kalani Rey OT Time Calculation: 32 mins

## 2019-02-14 NOTE — PROGRESS NOTES
1700 Fleets Enema administered and pt ambulated to  w/o asssitance. 1750  Patient voided 100cc clr tomas urine and bladder scanned for 75 cc. States she could feel the urine trickling but could not control it. Also return on enema fluid noted with flecks of stool.  (+) flatus;  Milk of Mag 30cc po given.

## 2019-02-14 NOTE — DISCHARGE INSTRUCTIONS
SPINE DISCHARGE  INSTRUCTIONS    Nadir Martinez. Linnette Leventhal, M.D. What can I do?  The only exercise you should do is walking. Start by walking twice a day for 5 minutes, then increase by  2-3 minutes every day until you reach 25 minutes twice a day. DO NOT sit for long periods of time (20 minutes at a time for the first couple of weeks, then gradually increase.  No heavy lifting (5 lbs max); no straining, twisting, or bending.  No driving until your physician tells you it is ok. It is, however, ok to ride short distances in a car.  If you are required to wear a back brace, you may remove it when you are sleeping unless your doctor has advised against it.  If you are required to wear a cervical collar, you must sleep in it. You can remove it only for showers. What can I eat?  You may resume your regular home diet as tolerated. If your throat is sore, you may want to eat soft food for the first few days. When can I take a shower?  You may shower leaving on your occlusive (waterproof) dressing on allowing water to run over the dressing. The dressing may be removed in 5 days. The small pieces of tape (steri strips)  underneath it should stay on. Let water run over them, then pat dry gently.  Do not take baths or soak in pools.  You may remove your brace for showering. Medications:   Check with your physician before taking any anti-inflammatory medicines (Advil, Aleve, ibuprofen, aspirin).  Take prescription medicine as directed. DO NOT take more than prescribed. Call your physician if the prescribed dose is not sufficiently controlling your pain.  It is important to have regular bowel movements. Pain medications may cause constipation. Drink plenty of fluids, get enough fiber in your diet, and use stool softeners and drink prune juice to help prevent constipation. Do not take laxatives if at all possible except in severe situations.   It can result in a vicious cycle of constipation and diarrhea.  Do not put any ointments or creams on your incision unless directed to do so by your physician.  Tobacco products should be avoided during the postoperative phase. When do I see the physician again?  Please call your physicians office as soon as you get home to schedule your 1st post operative appointment. You should be seen approximately two weeks after your surgery. At this appointment you will see your doctors Assistant for a wound check and to answer any questions you may have.  You will see your physician approximately six weeks after your surgery.  If you had a fusion, you will need to get an order for xrays to be taken prior to the six week appointment. These should be done on the day of the appointment or 1-2 days before. NOTIFY YOUR PHYSICIAN OF ANY OF THE FOLLOWING:     Fever above 101º for 24 hrs.  Nausea or vomiting.  Inability to urinate   Loss of bowel or bladder function (sudden onset of incontinence)   Changes in sensation (numbness, tingling, color change) in your extremities   Pain not relieved by your medicine.    Redness, swelling or drainage from your incision   Persistent pain in the calf of either leg   Development of severe pain      FOR APPOINTMENTS OR QUESTIONS CALL:    [facility]  [address]  [phone]

## 2019-02-14 NOTE — ANESTHESIA POSTPROCEDURE EVALUATION
Procedure(s): BILATERAL L5-S1 LUMBAR DISCECTOMY. Anesthesia Post Evaluation Patient location during evaluation: PACU Patient participation: complete - patient participated Level of consciousness: awake Pain management: adequate Airway patency: patent Anesthetic complications: no 
Cardiovascular status: hemodynamically stable Respiratory status: acceptable Hydration status: acceptable Comments: I have seen and evaluated the patient. The patient is ready for PACU discharge. [address], DO Visit Vitals /73 Pulse (!) 46 Temp 36.3 °C (97.4 °F) Resp 13 SpO2 100%

## 2019-02-14 NOTE — PROGRESS NOTES
I was paged about this patient regarding her symptoms and her MRI findings. I spoke directly with the Emergency Physician Dr. Jose Dao who examined the patient and stated that she had progressively worsening symptoms of numbness and tingling in her saddle region that started on Monday, February 11, 2019. On presentation to the emergency department this evening she had saddle anesthesiasia, no rectal tone and was leaking urine and was wearin g a diaper. MRI scan showed a very large central disc herniation at the L5-S1 level and she was diagnosed with cauda equine syndrome. Based on her history her symptoms and reported physical examination as well as review of the MRI which I independently reviewed, I agree with a diagnosis of cauda equina syndrome. I do not have privileges to perform spine surgery and I recommended that she be immediately transferred to [facility] that has this capability four urgent and potential he emergent decompression of the cauda equina. The potential hospitals available for transfer would be [facility] acute hospital, [facility], or [facility].  I discussed that I was available to come and examine the patient directly but this would potentially delay her care and delay her transfer. Dr. Jose Dao agreed that my direct examination was not necessary and would  not directly change her course of action which needs to be immediate transfer to a hospital with a spine surgeon on call. Dr. Jose Dao said that she would arrange for the transfer to one of these facilities immediately and I discussed that I am available for any further assistance that would be needed.

## 2019-02-14 NOTE — OP NOTES
Sadia Moore [id number]  [id number]    1970  51 y.o.  female       Operative Report    Date of Surgery: 2/13/2019     Preoperative Diagnosis: severe back pain     Postoperative Diagnosis: Cauda equina Syndrome with L5-S1 disc herniation     Surgeon(s) and Role:     Evelia Salas MD - Primary    Anesthesia: General     Procedure:   1. Bilateral L5-S1 microdiskectomy. 2. Use of a microscope. Indications: Pt. Is a 51 y.o. patient with bilateral leg pain and cauda equina syndrome. .  Patient was found to have a huge  herniated disc by MRI, the  patient decided to proceed to surgery. 2 grams of ancef were given within an hour of surgery and an order was written to stop it within 24 hours. SCD's were used throughout the entire case. Procedure in Detail:   After appropriate informed consent was obtained, the patient was taken to the operating suite where satisfactory anesthesia was induced. The patient was then turned into the prone position on the operating table on a carmella frame. All pressure points were carefully padded. Perioperative antibiotics were given. The lumbar region was prepped and draped in the usual sterile fashion. Intraoperative fluoroscopy was used to localize the  level. The skin was infiltrated with 1% lidocaine with epinephrine. A vertical linear incision was then made directly over the disc space. Dissection was carried down through the subcutaneous tissue. A subperiosteal dissection was done over the lamina and an xray was taken to ensure we were at the right disc space. The operating microscope was brought into the field and used for the remainder of the case. The Midas-Gurpreet high speed drill and Kerrison rongeur were used to perform a partial hemilaminectomy at L5-S1. Bilaterally,  The ligament was removed with care being given to protection of the underlying neural elements. The nerve root was identified and was gently mobilized using microsurgical technique.  The nerve root was then gently retracted laterally and the thecal sac medially, beneath this was a huge herniated disc. This was brought out in one large piece. Inferior to this was a dense, calcified disc which was broken up and taken out in a piecemeal fashion. A generous foraminotomy was carried out over the nerve root. The nerves were then inspected and were found to be completely free. Further microsurgical exploration in the epidural space did not reveal any further evidence of free disc material. The wound was then irrigated copiously with antibiotic solution. Meticulous hemostasis was obtained. . Closure was done in anatomical layers. The skin was re-approximated using steri-strips. A sterile dressing was applied overall. The patient was then turned back into the supine position on the stretcher where satisfactory general was reversed. The patient was then taken to the recovery room in satisfactory condition. Dr. Sridhar Olivera was present for the entire case from start to finish. Estimated Blood Loss:    25         Specimens:  None    Drains: None                Complications: None    Counts: Sponge and needle counts were correct times two.     Signed By:  Marcelo Banks MD     February 14, 2019

## 2019-02-14 NOTE — ANESTHESIA PREPROCEDURE EVALUATION
Anesthetic History No history of anesthetic complications Review of Systems / Medical History Patient summary reviewed, nursing notes reviewed and pertinent labs reviewed Pulmonary Within defined limits Neuro/Psych Within defined limits Cardiovascular Within defined limits GI/Hepatic/Renal 
Within defined limits Endo/Other Within defined limits Other Findings Comments: Cauda equina syndrome Physical Exam 
 
Airway Mallampati: II 
TM Distance: > 6 cm Neck ROM: normal range of motion Mouth opening: Normal 
 
 Cardiovascular Regular rate and rhythm,  S1 and S2 normal,  no murmur, click, rub, or gallop Dental 
No notable dental hx Pulmonary Breath sounds clear to auscultation Abdominal 
GI exam deferred Other Findings Anesthetic Plan ASA: 2, emergent Anesthesia type: general 
 
 
 
 
Induction: Intravenous Anesthetic plan and risks discussed with: Patient

## 2019-02-14 NOTE — CONSULTS
Neurosurgery  Patient under the care of Dr. Conchita Page of Orthopedics. The patient was at [facility] where he does his spine surgery. I have been told that Dr. Randall Chirinos does not have any coverage for his spinal patients. Thus patient has been transferred to this hospital.   Large L5-S1 hnp, perineal numbness, leaking urine, severe right leg pain. This is consistent with cauda equina syndrome. I discussed urgent need for surgery.    Will proceed now with L5-S1 bilateral discectomy

## 2019-02-14 NOTE — PERIOP NOTES
1000 Mother called, given update, and said that she is getting a bed and will call when patient goes to room.

## 2019-02-14 NOTE — CONSULTS
[address]    Name:  Sanjay Kim  MR#:  [id number]  :  1970  ACCOUNT #:  [de-identified]  DATE OF SERVICE:  2019      EMERGENCY ROOM CONSULTATION    REASON FOR CONSULTATION:  Herniated disk, cauda equina syndrome. HISTORY OF PRESENT ILLNESS:  The patient is a 58-year-old [facility] [employer] female,  she has been under the care of  Dr. Conchita Page of Orthopedics. She was at [facility], but apparently Dr. Randall Chirinos does not have any coverage for his spinal  patients, so she had to be transferred to a place where there is coverage for spinal  surgery. She then came to the [facility] Emergency Room where I saw her in  consultation. The patient had a herniated disk in the past.  Dr. Randall Chirinos has done several epidural  steroid injections for her. About a month ago, she began to have pain in her back  again, and her right leg with numbness and tingling of her leg, this progressed on  Monday, numbness and tingling the whole leg and to the perineal area with numbness  around the rectum and vagina. She began leaking urine and was wearing pads. She was  quite compensated since Monday. Because of this, she went to the emergency room at  [address] [place] [address] as detailed above. She has been able to walk. MRI was done which showed  a significant herniated disk at L5-S1 mainly on the right that pushing the entire  cauda equina over to other side, and I was called for evaluation. PAST MEDICAL HISTORY:  Glaucoma. FAMILY HISTORY:  Noncontributory. SOCIAL HISTORY:  Does not drink. Does not smoke. ALLERGIES:  NO KNOWN DRUG ALLERGIES. MEDICATIONS:  Medicine for glaucoma as well as oxycodone for pain. REVIEW OF SYSTEMS:  No nausea, vomiting, fevers, chills, chest pain, or shortness of  breath. Rest of the 10 systems are reviewed and are negative except as above.     PHYSICAL EXAMINATION:  GENERAL:  She is a well-developed and well-nourished female, sitting, in no acute  distress. HEENT:  Her head is normocephalic and atraumatic. Pupils are equal, round, and  reactive to light. Extraocular movements are intact. Face is symmetric. Tongue and  uvula are in midline. EXTREMITIES:  Shoulder shrug is normal.  Strength is 5/5 in her upper and lower  extremities with extension and foot pronation which is 4-/5. Sensation is decreased  along her whole right leg. RECTAL:  Rectal exam was done by the emergency room physician and had no rectal tone. NEUROLOGIC:  Deep tendon reflexes are difficult to elicit in the right leg. She is  awake, alert, oriented x3. Her speech is fluent. She has normal recent and remote  memory. Normal fund of knowledge. Normal cerebellar exam.    DIAGNOSTIC STUDIES:  MRI as above shows a large herniated disk L5-S1. IMPRESSION:  1. Herniated disk, L5-S1.  2.  Cauda equina syndrome. PLAN:  I discussed the findings with her. This is certainly an emergency given the  loss of urine and perineal numbness and large disk herniation. We discussed urgent  need for surgery. We discussed the risks including bleeding, infection, nerve  injury, worsening pain, numbness or weakness, chance of getting better, chance of  getting worse, risk of reherniation of the disk, the risk of need for more surgery,  the risk of heart attack, stroke, coma, death, blood clots in her legs or in her  upper extremities. We will plan for urgent surgery which should be a bilateral L5-S1 microdiskectomy.         Jamaica Bunn MD [id number]  D:  02/14/2019 0:00  T:  02/14/2019 12:36  JOB #:  [id number]

## 2019-02-14 NOTE — PROGRESS NOTES
Physical Therapy Note 2/14/19 Orders acknowledged and chart reviewed. Pt POD 0 from B L5-S1 discectomy 2* cauda equina syndrome. Will follow up POD 1 for PT evaluation per protocol.  
 
Elfego Fernandez, PT, DPT

## 2019-02-14 NOTE — PROGRESS NOTES
Problem: Mobility Impaired (Adult and Pediatric) Goal: *Acute Goals and Plan of Care (Insert Text) Physical Therapy Goals Initiated 2/14/2019 1. Patient will move from supine to sit and sit to supine  and scoot up and down in bed with independence within 4 days. 2. Patient will perform sit to stand with independence within 4 days. 3. Patient will ambulate with independence for 400 feet with the least restrictive device within 4 days. 4. Patient will ascend/descend 13 stairs with handrail(s) with independence within 4 days. 5. Patient will verbalize and demonstrate understanding of spinal precautions (No bending, lifting greater than 5 lbs, or twisting; log-roll technique; frequent repositioning as instructed) within 4 days. physical Therapy EVALUATION Patient: Rhoda Escamilla (54 y.o. female) Date: 2/14/2019 Primary Diagnosis: Lumbar herniated disc [M51.26] Procedure(s) (LRB): 
BILATERAL L5-S1 LUMBAR DISCECTOMY (Bilateral) 1 Day Post-Op Precautions:   Back ASSESSMENT : 
Based on the objective data described below, the patient presents with slightly impaired functional mobility as compared to baseline level 2* generalized post op pain, c/o saddle and R LE paresthesias, and slightly impaired balance s/p emergent B L5-S1 lumbar discectomy 2* cauda equina syndrome. Prior to admission, pt repots that she lived at home with family and was indep with all ADLs and mobility w/o use of AD - plans to stay with her mother who will provide assist as needed. Provided education on spinal precautions, log roll, and activity pacing. She was able to complete bed mobility, transfers, ambulation x300ft, and stair negotiation with overall CGA for safety only. Demos decreased and cautious pacing however no overt LOB or knee buckle observed. Anticipate pt will be appropriate for discharge home with family assist once medically cleared.  She is cleared from PT standpoint - will follow up for one additional visit if remains admitted for continued mobilization and education. May benefit from f/u with [facility] PT. Patient will benefit from skilled intervention to address the above impairments. Patients rehabilitation potential is considered to be Good Factors which may influence rehabilitation potential include:  
[x]         None noted 
[]         Mental ability/status []         Medical condition 
[]         Home/family situation and support systems 
[]         Safety awareness 
[]         Pain tolerance/management 
[]         Other: PLAN : 
Recommendations and Planned Interventions: 
[x]           Bed Mobility Training             [x]    Neuromuscular Re-Education 
[x]           Transfer Training                   []    Orthotic/Prosthetic Training 
[x]           Gait Training                         []    Modalities [x]           Therapeutic Exercises           []    Edema Management/Control 
[x]           Therapeutic Activities            [x]    Patient and Family Training/Education 
[]           Other (comment): Frequency/Duration: Patient will be followed by physical therapy  twice daily to address goals. Discharge Recommendations: Outpatient womens PT Further Equipment Recommendations for Discharge: None SUBJECTIVE:  
Patient stated It feels so much better than before surgery.  OBJECTIVE DATA SUMMARY:  
HISTORY:   
Past Medical History:  
Diagnosis Date  Glaucoma  Lumbar herniated disc 2016 History reviewed. No pertinent surgical history. Prior Level of Function/Home Situation: Indep with all ADLs and mobility Personal factors and/or comorbidities impacting plan of care: none Home Situation Home Environment: Private residence # Steps to Enter: 1 One/Two Story Residence: Two story, live on 1st floor Living Alone: No 
Support Systems: Family member(s) Patient Expects to be Discharged to[de-identified] Private residence Current DME Used/Available at Home: None EXAMINATION/PRESENTATION/DECISION MAKING: Critical Behavior: 
Neurologic State: Alert Orientation Level: Oriented X4 Cognition: Appropriate decision making, Appropriate for age attention/concentration Safety/Judgement: Awareness of environment, Insight into deficits Hearing: Auditory Auditory Impairment: NoneSkin:  Surgical dressing in place with slight drainage noted Edema:  
Range Of Motion: 
AROM: Within functional limits Strength:   
Strength: Within functional limits Tone & Sensation:  
Tone: Normal 
Sensation: Impaired(c/o saddle paresthesia and diminished R LE) Coordination: 
Coordination: Within functional limits Vision:  
  
Functional Mobility: 
Bed Mobility: 
Supine to Sit: Contact guard assistance Transfers: 
Sit to Stand: Contact guard assistance Stand to Sit: Contact guard assistance Balance:  
Sitting: Intact Standing: IntactAmbulation/Gait Training:Distance (ft): 300 Feet (ft) Assistive Device: Gait belt Ambulation - Level of Assistance: Contact guard assistance;Supervision Gait Description (WDL): Exceptions to [facility] Gait Abnormalities: Antalgic Base of Support: Narrowed Speed/Selena: Slow Stairs: 
Number of Stairs Trained: 3 Stairs - Level of Assistance: Contact guard assistance Rail Use: Right Functional Measure: 
Tinetti test: 
 
Sitting Balance: 1 Arises: 1 Attempts to Rise: 2 Immediate Standing Balance: 2 Standing Balance: 2 Nudged: 1 Eyes Closed: 1 Turn 360 Degrees - Continuous/Discontinuous: 1 Turn 360 Degrees - Steady/Unsteady: 1 Sitting Down: 1 Balance Score: 13 Indication of Gait: 1 
R Step Length/Height: 1 L Step Length/Height: 1 
R Foot Clearance: 1 L Foot Clearance: 1 Step Symmetry: 1 Step Continuity: 1 Path: 2 Trunk: 1 Walking Time: 1 Gait Score: 11 Total Score: 24 Tinetti Tool Score Risk of Falls 
<19 = High Fall Risk 19-24 = Moderate Fall Risk 25-28 = Low Fall Risk Lisa MORALES. Performance-Oriented Assessment of Mobility Problems in Elderly Patients. [facility] 66; [id number]. (Scoring Description: PT Bulletin Feb. 10, 1993) Older adults: Anu Patel et al, 2009; n = [address] elderly evaluated with ABC, JOEL, ADL, and IADL) · Mean JOEL score for males aged 69-68 years = 26.21(3.40) · Mean JOEL score for females age 69-68 years = 25.16(4.30) · Mean JOEL score for males over 80 years = 23.29(6.02) · Mean JOEL score for females over 80 years = 17.20(8.32) Physical Therapy Evaluation Charge Determination History Examination Presentation Decision-Making MEDIUM  Complexity : 1-2 comorbidities / personal factors will impact the outcome/ POC  LOW Complexity : 1-2 Standardized tests and measures addressing body structure, function, activity limitation and / or participation in recreation  LOW Complexity : Stable, uncomplicated  LOW Complexity : FOTO score of  Based on the above components, the patient evaluation is determined to be of the following complexity level: LOW Pain: 
Pain Scale 1: Numeric (0 - 10) Pain Intensity 1: 0 Pain Location 1: Back Pain Orientation 1: Posterior Pain Description 1: Constant Pain Intervention(s) 1: Medication (see MAR) Activity Tolerance:  
NAD Please refer to the flowsheet for vital signs taken during this treatment. After treatment:  
[x]         Patient left in no apparent distress up in bathroom with OT 
[]         Patient left in no apparent distress in bed 
[x]         Call bell left within reach [x]         Nursing notified 
[]         Caregiver present 
[]         Bed alarm activated COMMUNICATION/EDUCATION:  
The patients plan of care was discussed with: Occupational Therapist, Registered Nurse and Physician. [x]         Fall prevention education was provided and the patient/caregiver indicated understanding. [x]         Patient/family have participated as able in goal setting and plan of care. [x]         Patient/family agree to work toward stated goals and plan of care. []         Patient understands intent and goals of therapy, but is neutral about his/her participation. []         Patient is unable to participate in goal setting and plan of care. Thank you for this referral. 
Neli Carbone, PT, DPT Time Calculation: 19 mins

## 2019-02-14 NOTE — DISCHARGE SUMMARY
Discharge Summary     Patient ID:  Rachel Piña  [id number]   12 y.o.  1970    Admit date: 2/13/2019    Discharge Date: 02/15/19      Admitting Physician: Marcelo Banks MD     Discharge Physician: Magaly Phelps NP    Admission Diagnoses: Lumbar herniated disc [M51.26]    Last Procedure: Procedure(s):  BILATERAL L5-S1 LUMBAR DISCECTOMY    Discharge Diagnoses: Active Problems:    Lumbar herniated disc (2/14/2019)       Consults: None    Significant Diagnostic Studies:   1. MRI lumbar spine without contrast on 02/13/19 shows L5-S1 large HNP causing severe spinal canal compromise. 2. MRI thoracic spine without contrast on 02/13/19 shows normal study    Patient condition upon discharge: Stable    Hospital Course: The patient reports she has a history of a lumbar herniated disc and has been followed as an outpatient by Dr. Heather Phoenix. She has had several steroid injections in her back. A month ago, she developed severe lower back pain again and right leg numbness and tingling. She had pain going down the back of her calf. On Monday of this week, she progressed to numbness of the entire right leg and numbness in her perineum, including around the rectum and vagina. She began experiencing urinary incontinence and requiring pads. She has not had a bowel movement since Sunday. She presented to the [facility] ER where an MRI was completed. This revealed a large herniated disc at the L5-S1 level, mainly on the right that pushed the entire cauda equina over to the other side. She was transferred to [facility] for neurosurgical care as the orthopedic spine surgeon that was following her did not have call coverage for his spinal patients.     The patient was taken to the operating room emergently on 02/13/19 by Dr. Belle Lindsey where she underwent a bilateral L5-S1 discectomy. The intraoperative findings can be found in Dr. Blossom Gann operative report.  Post-operatively, she boarded in the PACU overnight as there were no beds available on the spine floor. Later that day, she transferred to the spine floor. She worked with PT/OT for ambulation and teaching of spinal precautions. Her reyes catheter was removed and she voiding with little control at first, but later was able to control her stream of urine. She had difficulties with bowel movements and due to the cauda equina syndrome and her last bowel movement being Sunday prior to admission, we felt like she needed to have a bowel movement prior to discharge to ensure her bowels were working ok. After receiving an enema, milk of magnesia, bisacodyl suppository and magnesium citrate, she was able to have a small bowel movement. She was afebrile and vital signs remained stable. She was taking PO well, voiding on her own, and ambulating without assistance. She was ready for discharge on POD2. She was given a prescription for outpatient physical therapy with a focus on women's health due to her perineal numbness and urinary incontinence. She was also given a list of women's health PT providers to choose from. Her dressing was changed by myself prior to discharge and she will follow-up with Dr. Radha Ledesma in 2 weeks. Disposition: Home    Patient Instructions:   Current Discharge Medication List      START taking these medications    Details   cyclobenzaprine (FLEXERIL) 10 mg tablet Take 1 Tab by mouth three (3) times daily as needed for Muscle Spasm(s). Qty: 20 Tab, Refills: 0      HYDROcodone-acetaminophen (NORCO) 5-325 mg per tablet Take 1 Tab by mouth every four (4) hours as needed. Max Daily Amount: 6 Tabs. Qty: 15 Tab, Refills: 0    Associated Diagnoses: Herniated nucleus pulposus, L5-S1; Acute bilateral low back pain with right-sided sciatica         STOP taking these medications       oxyCODONE-acetaminophen (PERCOCET) 2.5-325 mg per tablet Comments:   Reason for Stopping:               Diet: Reference my discharge instructions. Activity: Reference my discharge instructions.     EXAM: Gen: NAD. Neuro: A&Ox3. Follows commands. Speech clear. Affect normal.  MACIEL spontaneously. Strength 5/5 in UE and LE BL. Sensory deficits in the perineal area improving  Gait deferred. Skin: Some pink-tinged drainage at the inferior end of her incision. Steri-strips in place. Dressing changed prior to discharge  Heart RRR  Lungs CTA BL  Abd soft, NT, hypoactive bowel sounds. Ext no edema    No orders of the defined types were placed in this encounter. Total time discharging patient took greater than 30 minutes.     Signed:  Soco Engle NP  02/18/19 [id number]

## 2019-02-14 NOTE — PROGRESS NOTES
TRANSFER - IN REPORT: 
 
Verbal report received from Rogers Norton RN(name) on Joanne Oropeza  being received from PACU(unit) for routine post - op Report consisted of patients Situation, Background, Assessment and  
Recommendations(SBAR). Information from the following report(s) SBAR was reviewed with the receiving nurse. Opportunity for questions and clarification was provided. Assessment completed upon patients arrival to unit and care assumed. Primary Nurse Rexanne Opitz, RN and ZAYNAB Royal performed a dual skin assessment on this patient No impairment noted Polo score is 20

## 2019-02-14 NOTE — PROGRESS NOTES
Neurosurgery Progress Note Kristine Card 
[phone] Admit Date: 2019 LOS: 0 days Daily Progress Note: 2019 POD:1 Day Post-Op S/P: Procedure(s): BILATERAL L5-S1 LUMBAR DISCECTOMY Subjective: The patient reports she has a history of a lumbar herniated disc and has been followed as an outpatient by Dr. Katherin Acuña. She has had several steroid injections in her back. A month ago, she developed severe lower back pain again and right leg numbness and tingling. She had pain going down the back of her calf. On Monday of this week, she progressed to numbness of the entire right leg and numbness in her perineum, including around the rectum and vagina. She began experiencing urinary incontinence and requiring pads. She has not had a bowel movement since . She presented to the [facility] ER where an MRI was completed. This revealed a large herniated disc at the L5-S1 level, mainly on the right that pushed the entire cauda equina over to the other side. She was transferred to [facility] for neurosurgical care as the orthopedic spine surgeon that was following her did not have call coverage for his spinal patients. The patient was taken to the operating room emergently last night by Dr. Trip Mckay where she underwent a bilateral L5-S1 discectomy. She boarded in the PACU overnight as there were no beds available on the spine floor. This morning, she states she still has the numbness in her perineal area. The severe back pain and right leg pain has dissipated. She states her calf feels tight. Denies chest pain, leg pain, nausea, vomiting, difficulty swallowing, headache, and dyspnea. Objective:  
 
Vital signs Temp (24hrs), Av.2 °F (36.8 °C), Min:97.4 °F (36.3 °C), Max:99.5 °F (37.5 °C) 
 701 -  190 In: 1030.8 [P.O.:610; I.V.:420.8] Out: 250 [Urine:250]  1901 -  0700 In: 1350 [I.V.:1350] Out: 1250 [NRMXK:6161] Visit Vitals /70 (BP 1 Location: Right arm, BP Patient Position: At rest) Pulse 85 Temp 98.5 °F (36.9 °C) Resp 16 SpO2 100% O2 Flow Rate (L/min): 2 l/min O2 Device: Room air Pain control Pain Assessment Pain Scale 1: Numeric (0 - 10) Pain Intensity 1: 0 Pain Onset 1: POST OP Pain Location 1: Back Pain Orientation 1: Posterior Pain Description 1: Constant Pain Intervention(s) 1: Medication (see MAR) PT/OT Gait Physical Exam: 
Gen:NAD. Neuro: A&Ox3. Follows commands. Speech clear. Affect normal. 
MACIEL spontaneously. Strength 5/5 in UE and LE BL. Sensory deficits in the perineal area persist 
Gait deferred. Skin: Lumbar dressing C/D/I. 
 
24 hour results: 
 
Recent Results (from the past 24 hour(s)) CBC WITH AUTOMATED DIFF Collection Time: 02/13/19  2:22 PM  
Result Value Ref Range WBC 14.3 (H) 3.6 - 11.0 K/uL  
 RBC 5.13 3.80 - 5.20 M/uL  
 HGB 14.7 11.5 - 16.0 g/dL HCT 45.0 35.0 - 47.0 % MCV 87.7 80.0 - 99.0 FL  
 MCH 28.7 26.0 - 34.0 PG  
 MCHC 32.7 30.0 - 36.5 g/dL  
 RDW 13.2 11.5 - 14.5 % PLATELET 232 488 - 719 K/uL MPV 10.2 8.9 - 12.9 FL  
 NRBC 0.0 0  WBC ABSOLUTE NRBC 0.00 0.00 - 0.01 K/uL NEUTROPHILS 89 (H) 32 - 75 % LYMPHOCYTES 6 (L) 12 - 49 % MONOCYTES 5 5 - 13 % EOSINOPHILS 0 0 - 7 % BASOPHILS 0 0 - 1 % IMMATURE GRANULOCYTES 1 (H) 0.0 - 0.5 % ABS. NEUTROPHILS 12.7 (H) 1.8 - 8.0 K/UL  
 ABS. LYMPHOCYTES 0.8 0.8 - 3.5 K/UL  
 ABS. MONOCYTES 0.7 0.0 - 1.0 K/UL  
 ABS. EOSINOPHILS 0.0 0.0 - 0.4 K/UL  
 ABS. BASOPHILS 0.0 0.0 - 0.1 K/UL  
 ABS. IMM. GRANS. 0.1 (H) 0.00 - 0.04 K/UL  
 DF AUTOMATED METABOLIC PANEL, BASIC Collection Time: 02/13/19  2:22 PM  
Result Value Ref Range Sodium 140 136 - 145 mmol/L Potassium 4.4 3.5 - 5.1 mmol/L Chloride 102 97 - 108 mmol/L  
 CO2 28 21 - 32 mmol/L Anion gap 10 5 - 15 mmol/L Glucose 132 (H) 65 - 100 mg/dL  BUN 22 (H) 6 - 20 MG/DL  
 Creatinine 1.07 (H) 0.55 - 1.02 MG/DL  
 BUN/Creatinine ratio 21 (H) 12 - 20 GFR est AA >60 >60 ml/min/1.73m2 GFR est non-AA 55 (L) >60 ml/min/1.73m2 Calcium 9.2 8.5 - 10.1 MG/DL  
SED RATE (ESR) Collection Time: 02/13/19  2:22 PM  
Result Value Ref Range Sed rate, automated 3 0 - 20 mm/hr C REACTIVE PROTEIN, QT Collection Time: 02/13/19  2:22 PM  
Result Value Ref Range C-Reactive protein <0.29 <0.60 mg/dL URINALYSIS W/ REFLEX CULTURE Collection Time: 02/13/19  2:27 PM  
Result Value Ref Range Color YELLOW/STRAW Appearance CLEAR CLEAR Specific gravity 1.030 1.003 - 1.030    
 pH (UA) 6.0 5.0 - 8.0 Protein NEGATIVE  NEG mg/dL Glucose NEGATIVE  NEG mg/dL Ketone NEGATIVE  NEG mg/dL Bilirubin NEGATIVE  NEG Blood NEGATIVE  NEG Urobilinogen 0.2 0.2 - 1.0 EU/dL Nitrites NEGATIVE  NEG Leukocyte Esterase NEGATIVE  NEG    
 WBC 0-4 0 - 4 /hpf  
 RBC 0-5 0 - 5 /hpf Epithelial cells FEW FEW /lpf Bacteria NEGATIVE  NEG /hpf  
 UA:UC IF INDICATED CULTURE NOT INDICATED BY UA RESULT CNI Assessment:  
 
Active Problems: 
  Lumbar herniated disc (2/14/2019) Plan: 1. L5-S1 herniated disc 
 - s/p L5-S1 discectomy 
 - Pain control with Norco, Tylenol, Flexeril PRN 
 - PT/OT 
 - Normalize and mobilize 2. Cauda equina syndrome - Remove reyes - Bladder scans - Straight cath protocol - Enema for bowel movement Activity: up ad florinda DVT ppx: SCDs Dispo: home Plan d/w Dr. Jose C San. If patient is able to void, eat, ambulate, pain under control,  Also would like her to have a bowel movement since she has not had one since Sunday likely due to the cauda equina compression. Pt will likely need to stay the night to make sure her bowel and bladder issues are improving prior to discharge to home. If she does check all the boxes for discharge her Rx and discharge instructions are on the chart.  
 
 
Samia Templeton NP

## 2019-02-14 NOTE — ROUTINE PROCESS
TRANSFER - OUT REPORT: 
 
Verbal report given to Cady Jaziel on Zully Bergeron  being transferred to [facility] ER for routine progression of care Report consisted of patients Situation, Background, Assessment and  
Recommendations(SBAR). Information from the following report(s) SBAR, ED Summary, [facility] and Recent Results was reviewed with the receiving nurse. Opportunity for questions and clarification was provided.

## 2019-02-14 NOTE — PROGRESS NOTES
Occupational Therapy Chart reviewed. Referral for OT received. Pt currently in PACU s/p B L5-S1 lumbar discectomy 2* cauda equina syndrome with L5-S1 disc herniation. Will continue to follow as appropriate.  Nikolas Hall, OT

## 2019-02-15 VITALS
TEMPERATURE: 98.4 F | RESPIRATION RATE: 16 BRPM | SYSTOLIC BLOOD PRESSURE: 128 MMHG | DIASTOLIC BLOOD PRESSURE: 79 MMHG | HEART RATE: 45 BPM | OXYGEN SATURATION: 98 %

## 2019-02-15 PROCEDURE — 99218 HC RM OBSERVATION: CPT

## 2019-02-15 PROCEDURE — 74011250637 HC RX REV CODE- 250/637: Performed by: NURSE PRACTITIONER

## 2019-02-15 PROCEDURE — 74011250637 HC RX REV CODE- 250/637: Performed by: SPECIALIST

## 2019-02-15 RX ORDER — MAGNESIUM CITRATE
148 SOLUTION, ORAL ORAL
Status: COMPLETED | OUTPATIENT
Start: 2019-02-15 | End: 2019-02-15

## 2019-02-15 RX ORDER — HYDROCODONE BITARTRATE AND ACETAMINOPHEN 5; 325 MG/1; MG/1
2 TABLET ORAL
Status: DISCONTINUED | OUTPATIENT
Start: 2019-02-15 | End: 2019-02-15 | Stop reason: HOSPADM

## 2019-02-15 RX ADMIN — Medication 10 ML: at 06:48

## 2019-02-15 RX ADMIN — ACETAMINOPHEN 650 MG: 325 TABLET ORAL at 06:48

## 2019-02-15 RX ADMIN — HYDROCODONE BITARTRATE AND ACETAMINOPHEN 2 TABLET: 5; 325 TABLET ORAL at 06:49

## 2019-02-15 RX ADMIN — ACETAMINOPHEN 650 MG: 325 TABLET ORAL at 12:52

## 2019-02-15 RX ADMIN — BISACODYL 10 MG: 10 SUPPOSITORY RECTAL at 10:37

## 2019-02-15 RX ADMIN — MAGESIUM CITRATE 148 ML: 1.75 LIQUID ORAL at 11:31

## 2019-02-15 NOTE — PROGRESS NOTES
Physical Therapy Attempted to follow up for PT treatment and nurse present reviewing discharge instructions. Patient reports no concerns with mobility. Deferred treatment.   
Crys Moore, PT, DPT

## 2019-02-15 NOTE — PROGRESS NOTES
Neurosurgery Progress Note Kristine Chow 
[phone] Admit Date: 2019 LOS: 0 days Daily Progress Note: 2/15/2019 POD:2 Day Post-Op S/P: Procedure(s): BILATERAL L5-S1 LUMBAR DISCECTOMY 
 
HPI: The patient reports she has a history of a lumbar herniated disc and has been followed as an outpatient by Dr. Josie Barrera. She has had several steroid injections in her back. A month ago, she developed severe lower back pain again and right leg numbness and tingling. She had pain going down the back of her calf. On Monday of this week, she progressed to numbness of the entire right leg and numbness in her perineum, including around the rectum and vagina. She began experiencing urinary incontinence and requiring pads. She has not had a bowel movement since . She presented to the [facility] ER where an MRI was completed. This revealed a large herniated disc at the L5-S1 level, mainly on the right that pushed the entire cauda equina over to the other side. She was transferred to [facility] for neurosurgical care as the orthopedic spine surgeon that was following her did not have call coverage for his spinal patients. The patient was taken to the operating room emergently last night by Dr. Jennifer Gonzalez where she underwent a bilateral L5-S1 discectomy. She boarded in the PACU overnight as there were no beds available on the spine floor. Subjective: The patient was able to urinate last night and has not had any more episodes of incontinence. Pain is well controlled. Still waiting on a bowel movement. She feels like she is cramping and distended to where she will hopefully have a bowel movement soon. Denies chest pain, leg pain, nausea, vomiting, difficulty swallowing, headache, and dyspnea. Objective:  
 
Vital signs Temp (24hrs), Av.7 °F (37.1 °C), Min:98.4 °F (36.9 °C), Max:99.1 °F (37.3 °C) 
 02/15 0701 - 02/15 1900 In: -  
Out: 250 [Urine:250]  1901 - 02/15 07 In: 2480.8 [P.O.:710; I.V.:1770.8] Out: 2450 [Urine:2450] Visit Vitals /79 Pulse (!) 45 Temp 98.4 °F (36.9 °C) Resp 16 SpO2 98% Breastfeeding? No  
 O2 Flow Rate (L/min): 2 l/min O2 Device: Room air Pain control Pain Assessment Pain Scale 1: Numeric (0 - 10) Pain Intensity 1: 0 Pain Onset 1: POST OP Pain Location 1: Back Pain Orientation 1: Lower, Mid 
Pain Description 1: Aching Pain Intervention(s) 1: Medication (see MAR) PT/OT Gait     Gait Base of Support: Narrowed Speed/Selena: Slow Gait Abnormalities: Antalgic Ambulation - Level of Assistance: Contact guard assistance, Supervision Distance (ft): 300 Feet (ft) Assistive Device: Gait belt Rail Use: Right Stairs - Level of Assistance: Contact guard assistance Number of Stairs Trained: 3 Physical Exam: 
Gen:NAD. Neuro: A&Ox3. Follows commands. Speech clear. Affect normal. 
MACIEL spontaneously. Strength 5/5 in UE and LE BL. Sensory deficits in the perineal area Gait deferred. Skin: Some pink-tinged drainage at the inferior end of her incision. Steri-strips in place. Dressing changed by myself. 24 hour results: No results found for this or any previous visit (from the past 24 hour(s)). Assessment:  
 
Active Problems: 
  Lumbar herniated disc (2/14/2019) Plan: 1. L5-S1 herniated disc 
 - s/p L5-S1 discectomy 02/13 
 - Pain control with Norco, Tylenol, Flexeril PRN 
 - PT/OT 
 - Normalize and mobilize 2. Cauda equina syndrome - Pt has urinated - Received milk of magnesia, bisacodyl, magnesium citrate, and enema. - pt's last BM was Qamar 02/10. Needs to have BM prior to discharge. Activity: up ad florinda DVT ppx: SCDs Dispo: home Plan d/w Dr. Magaly Boss. If patient is able to void, eat, ambulate, pain under control. Also would like her to have a bowel movement since she has not had one since Sunday likely due to the cauda equina compression.  Pt will likely need to stay the night to make sure her bowel issues are improving prior to discharge to home. If she does check all the boxes for discharge her Rx and discharge instructions are on the chart. Hopeful she can discharge to [facility] this evening.  
 
 
Magaly Phelps NP

## 2019-02-15 NOTE — PROGRESS NOTES
Care Management Interventions PCP Verified by CM: Yes 
Palliative Care Criteria Met (RRAT>21 & CHF Dx)?: No 
MyChart Signup: No 
Discharge Durable Medical Equipment: No 
Health Maintenance Reviewed: Yes Physical Therapy Consult: Yes Occupational Therapy Consult: Yes Speech Therapy Consult: No 
Current Support Network: Other, Own Home Confirm Follow Up Transport: Family Plan discussed with Pt/Family/Caregiver: Yes Freedom of Choice Offered: Yes [employer] Information Provided?: No 
Discharge Location Discharge Placement: Home with family assistance Reason for Admission:   Herniated disk, L5-S1 
                
RRAT Score:    3 Plan for utilizing home health:      Not needed Likelihood of Readmission:  low Transition of Care Plan:   Chart reviewed for transitions of care, discussed patient during rounds. Patient admitted for the above, has been working with PT and OT and they are currently recommending outpatient therapy follow up. No CM needs at this time.  
Giovana HECKW, ACM

## 2019-02-15 NOTE — PROGRESS NOTES
Physical Therapy Chart reviewed for updates, consulted with nursing, and attempted to see patient for PT treatment. Patient in room ambulating on arrival. She reports continue walking in room in attempts for bowel movement. While talking with patient, her food tray arrived. She requested time to eat. Will defer this morning and follow up this afternoon.   
Tresa Flores, PT, DPT

## 2019-02-15 NOTE — PROGRESS NOTES
Bedside and Verbal shift change report given to [id number] Gutierrez (oncoming nurse) by SELENE Abbasi RN (offgoing nurse). Report given with SBAR, Kardex, Intake/Output, MAR and Recent Results.

## 2021-06-21 ENCOUNTER — TRANSCRIBE ORDER (OUTPATIENT)
Dept: SCHEDULING | Age: 51
End: 2021-06-21

## 2021-06-21 DIAGNOSIS — D49.2 BONE TUMOR: Primary | ICD-10-CM

## 2021-06-25 ENCOUNTER — HOSPITAL ENCOUNTER (OUTPATIENT)
Dept: MRI IMAGING | Age: 51
Discharge: HOME | End: 2021-06-25
Attending: PODIATRIST
Payer: COMMERCIAL

## 2021-06-25 VITALS — BODY MASS INDEX: 24.66 KG/M2 | WEIGHT: 167 LBS

## 2021-06-25 DIAGNOSIS — D49.2 BONE TUMOR: ICD-10-CM

## 2021-06-25 PROCEDURE — 73723 MRI JOINT LWR EXTR W/O&W/DYE: CPT

## 2021-06-25 PROCEDURE — 74011250636 HC RX REV CODE- 250/636: Performed by: RADIOLOGY

## 2021-06-25 PROCEDURE — A9575 INJ GADOTERATE MEGLUMI 0.1ML: HCPCS | Performed by: RADIOLOGY

## 2021-06-25 RX ORDER — GADOTERATE MEGLUMINE 376.9 MG/ML
15 INJECTION INTRAVENOUS
Status: COMPLETED | OUTPATIENT
Start: 2021-06-25 | End: 2021-06-25

## 2021-06-25 RX ADMIN — GADOTERATE MEGLUMINE 15 ML: 376.9 INJECTION INTRAVENOUS at 19:18

## 2022-03-19 PROBLEM — M51.26 LUMBAR HERNIATED DISC: Status: ACTIVE | Noted: 2019-02-14

## 2023-05-11 RX ORDER — HYDROCODONE BITARTRATE AND ACETAMINOPHEN 5; 325 MG/1; MG/1
1 TABLET ORAL EVERY 4 HOURS PRN
COMMUNITY
Start: 2019-02-14

## 2023-05-11 RX ORDER — CYCLOBENZAPRINE HCL 10 MG
TABLET ORAL 3 TIMES DAILY PRN
COMMUNITY
Start: 2019-02-14

## (undated) DEVICE — STERILE POLYISOPRENE POWDER-FREE SURGICAL GLOVES: Brand: PROTEXIS

## (undated) DEVICE — KENDALL SCD EXPRESS SLEEVES, KNEE LENGTH, MEDIUM: Brand: KENDALL SCD

## (undated) DEVICE — PREP SKN PREVAIL 40ML APPL --

## (undated) DEVICE — NEEDLE SPNL 20GA L3.5IN YEL HUB S STL REG WALL FIT STYL W/

## (undated) DEVICE — SUTURE MCRYL SZ 4 0 L18IN ABSRB VLT PS 1 L24MM 3 8 CIR REV Y682H

## (undated) DEVICE — TELFA ADHESIVE ISLAND DRESSING: Brand: TELFA

## (undated) DEVICE — PILLOW POS AD L7IN R FOAM HD REST INTUB SLOT DISP

## (undated) DEVICE — HANDLE LT SNAP ON ULT DURABLE LENS FOR TRUMPF ALC DISPOSABLE

## (undated) DEVICE — MEDI-VAC NON-CONDUCTIVE SUCTION TUBING: Brand: CARDINAL HEALTH

## (undated) DEVICE — SUTURE VCRL SZ 0 L18IN ABSRB VLT L36MM CT-1 1/2 CIR J740D

## (undated) DEVICE — STERILE POLYISOPRENE POWDER-FREE SURGICAL GLOVES WITH EMOLLIENT COATING: Brand: PROTEXIS

## (undated) DEVICE — BONE WAX WHITE: Brand: BONE WAX WHITE

## (undated) DEVICE — DEVON™ KNEE AND BODY STRAP 60" X 3" (1.5 M X 7.6 CM): Brand: DEVON

## (undated) DEVICE — SUTURE VCRL SZ 2-0 L18IN ABSRB UD L26MM CP-2 1/2 CIR REV J762D

## (undated) DEVICE — BIPOLAR FORCEPS CORD: Brand: VALLEYLAB

## (undated) DEVICE — TRAY CATH 16F DRN BG LTX -- CONVERT TO ITEM 363158

## (undated) DEVICE — 1200 GUARD II KIT W/5MM TUBE W/O VAC TUBE: Brand: GUARDIAN

## (undated) DEVICE — SOLUTION IV 1000ML 0.9% SOD CHL

## (undated) DEVICE — LAMINECTOMY RICHMOND-LF: Brand: MEDLINE INDUSTRIES, INC.

## (undated) DEVICE — INFECTION CONTROL KIT SYS

## (undated) DEVICE — DRAPE MICSCP W46XL120IN POLY DRAWSTRAP W STEREO OBS TB AND

## (undated) DEVICE — NEEDLE HYPO 22GA L1.5IN BLK S STL HUB POLYPR SHLD REG BVL